# Patient Record
Sex: FEMALE | Race: BLACK OR AFRICAN AMERICAN | URBAN - METROPOLITAN AREA
[De-identification: names, ages, dates, MRNs, and addresses within clinical notes are randomized per-mention and may not be internally consistent; named-entity substitution may affect disease eponyms.]

---

## 2021-03-10 ENCOUNTER — HOSPITAL ENCOUNTER (OUTPATIENT)
Dept: PREADMISSION TESTING | Facility: HOSPITAL | Age: 61
Discharge: HOME | End: 2021-03-10
Attending: UROLOGY

## 2021-03-10 ENCOUNTER — CONVERSION ENCOUNTER (OUTPATIENT)
Dept: SURGERY | Facility: CLINIC | Age: 61
End: 2021-03-10

## 2021-03-10 ENCOUNTER — HOSPITAL ENCOUNTER (OUTPATIENT)
Dept: SURGERY | Facility: CLINIC | Age: 61
Discharge: HOME | End: 2021-03-10
Attending: UROLOGY

## 2021-03-10 ENCOUNTER — OFFICE VISIT CONVERTED (OUTPATIENT)
Dept: UROLOGY | Facility: CLINIC | Age: 61
End: 2021-03-10
Attending: UROLOGY

## 2021-03-11 LAB — SARS-COV-2 RNA SPEC QL NAA+PROBE: NOT DETECTED

## 2021-03-12 LAB — BACTERIA UR CULT: NORMAL

## 2021-03-15 ENCOUNTER — HOSPITAL ENCOUNTER (OUTPATIENT)
Dept: PERIOP | Facility: HOSPITAL | Age: 61
Setting detail: HOSPITAL OUTPATIENT SURGERY
Discharge: HOME | End: 2021-03-15
Attending: UROLOGY

## 2021-03-25 LAB
COLOR STONE: NORMAL
COMPN STONE: NORMAL
CONV CA OXALATE DIHYDRATE: 20 %
CONV CA OXALATE MONOHYDRATE: 80 %
CONV CALCULI COMMENT: NORMAL
CONV CALCULI DISCLAIMER: NORMAL
CONV CALCULI NOTE: NORMAL
CONV PHOTO (CALCULI): NORMAL
SIZE STONE: NORMAL MM
WT STONE: 27 MG

## 2021-04-12 ENCOUNTER — HOSPITAL ENCOUNTER (OUTPATIENT)
Dept: ULTRASOUND IMAGING | Facility: HOSPITAL | Age: 61
Discharge: HOME | End: 2021-04-12
Attending: UROLOGY

## 2021-04-20 ENCOUNTER — TELEPHONE CONVERTED (OUTPATIENT)
Dept: UROLOGY | Facility: CLINIC | Age: 61
End: 2021-04-20
Attending: UROLOGY

## 2021-05-10 NOTE — H&P
History and Physical      Patient Name: Sandrita Redman   Patient ID: [id number]   Sex: Female   YOB: 1960    Referring Provider: Jarrod Li MD    Visit Date: March 10, 2021    Provider: Cynthia Reed MD   Location: [facility]   Location Address: [address] [place] [address]  [id number]   Location Phone: [phone]          Chief Complaint  · Patient is here for urological concerns      History Of Present Illness     60-year-old very pleasant lady presents for further evaluation of ureteral stone.  Patient initially had acute, severe left-sided flank pain around Dana time.  Pains became so severe she presented to ER for further evaluation.  She was diagnosed with a 4 mm proximal left ureteral calculi and sent home after control of pain for trial of passage.  Patient then continued to do well but had recurrence of acute pain episode with associated emesis 1 week ago.  Repeat CT scan at [facility] indicates stable 3-4 mm stone involving the proximal left ureter with mild left hydroureteronephrosis.  Additional punctate nonobstructing left renal stone is noted.  Patient denies significant pain today but states she has not passed the stone.  Denies any associated hematuria or fever.  No voiding complaints.  No nausea or emesis today.  Not currently taking medications for symptom control.  Denies history of nephrolithiasis.    Labs, 2/26/2021:  Creatinine 0.8  WBC: 7.7  Urine culture: No pathogens isolated, contaminant    CT scan, 3/1/2021 ([facility]) 4 mm stone is noted involving the proximal left ureter causing mild left sided hydroureteronephrosis.  Additional nonobstructing left renal stone is noted.  Findings appear similar to the prior examination (12/28/2020).       Past Medical History  Arthritis; High blood pressure; Kidney stones         Past Surgical History  Caesarean section         Medication List  Bystolic 10 mg oral tablet; diltiazem HCl 180 mg  "oral capsule,ext.rel 24h degradable; losartan 25 mg oral tablet; meloxicam 15 mg oral tablet         Allergy List  NO KNOWN DRUG ALLERGIES       Allergies Reconciled  Family Medical History  Diabetes, unspecified type; Family history of breast cancer         Social History  Tobacco (Never)         Review of Systems  · Constitutional  o Denies  o : fever, chills  · Gastrointestinal  o Denies  o : nausea, vomiting      Vitals  Date Time BP Position Site L\R Cuff Size HR RR TEMP (F) WT  HT  BMI kg/m2 BSA m2 O2 Sat FR L/min FiO2        03/10/2021 10:23 AM       14  228lbs 0oz 5'  5\" 37.94 2.18             Physical Examination  · Constitutional  o Appearance  o : Well nourished, well developed patient in no acute distress.  · Eyes  o Conjunctivae  o : Conjunctivae normal  o Sclerae  o : Sclerae white  · Ears, Nose, Mouth and Throat  o Ears  o :   § Hearing  § : Intact to conversational voice both ears  § Ears  § : Normal  o Nose  o :   § External Nose  § : Appearance normal  · Skin and Subcutaneous Tissue  o General Inspection  o : No rashes, lesions, or areas of discoloration present  o General Palpation  o : Skin Turgor Normal  · Neurologic  o Mental Status Examination  o :   § Orientation  § : Alert and Oriented X3  o Gait and Station  o :   § Gait Screening  § : Ambulating without difficulty  · Psychiatric  o Mood and Affect  o : Mood normal, affect appropriate              Assessment  · Ureteral stone with hydronephrosis       Hydronephrosis with renal and ureteral calculous obstruction     592.1/N13.2    Problems Reconciled  Plan  · Medications  o Medications have been Reconciled  o Transition of Care or Provider Policy  · Instructions  o Electronically Identified Patient Education Materials Provided Electronically     Left ureteral calculi-approximately 4 mm in size.  CT report reviewed and discussed with patient.  Patient has performed trial of passage for over 2 months with 2 presentations to ER due to pain.  " Recommend to proceed with definitive surgical management of stone at this time.    Will proceed with cystoscopy, left retrograde pyelogram, ureteroscopy, laser lithotripsy, stent placement.  Risks, benefits and alternatives were discussed with patient including bleeding, infection, damage to surrounding structures, stone recurrence, stricture.  Patient also notes understanding that if unable to reach the level of the stone or if significant purulent discharge noted upon initiation of procedure that stent will be placed in definitive stone management will be deferred until the collecting system is optimized.    Counseled to return to ER immeadiately if fever, intolerance to PO; persistent emesis, or uncontrollable pain    Will schedule OR  All question addressed at this time.    MDM moderate: 1 undiagnosed new problem with uncertain prognosis; review of prior external notes; review of result of each unique test; decision regarding minor surgery with identified patient or procedure risk factors             Electronically Signed by: Cynthia Reed MD -Author on March 10, 2021 04:06:24 PM

## 2021-05-10 NOTE — H&P
"   History and Physical      Patient Name: Sandrita Redman   Patient ID: [id number]   Sex: Female   YOB: 1960    Referring Provider: Jarrod Li MD    Visit Date: March 10, 2021    Provider: Cynthia Reed MD   Location: [facility]   Location Address: [address] [place] [address]  [id number]   Location Phone: [phone]          Chief Complaint  · Outpatient History & Physical / Surgical Orders      History Of Present Illness  [facility]  Outpatient History and Physical Surgical Orders  Preadmission Location: Phone Preadmission Time: 08:00 AM   Which Facility: [facility] Surgery Date: 03/15/2021 Preadmission Testing Date: 03/11/2021   Patient's Name: Sandrita Redman YOB: 1960   Chief complaint/history present illness: left ureteral stone   Current Medication List: Bystolic 10 mg oral tablet, diltiazem HCl 180 mg oral capsule,ext.rel 24h degradable, losartan 25 mg oral tablet, and meloxicam 15 mg oral tablet   Allergies: NO KNOWN DRUG ALLERGIES   Significant past medical: Arthritis, High blood pressure, and Kidney stones   Past Surgical History: Caesarean section   Examination of heart and lungs: Breath sounds normal, no distress, Abdomen soft, non-tender, BSx4 are positive, and Regular rate, no chest retractions         Allergy List    Allergies Reconciled  Vitals  Date Time BP Position Site L\R Cuff Size HR RR TEMP (F) WT  HT  BMI kg/m2 BSA m2 O2 Sat FR L/min FiO2 HC       03/10/2021 10:23 AM       14  228lbs 0oz 5'  5\" 37.94 2.18                     Assessment  · Pre-Surgical Orders     V72.84  · Ureteral stone     592.1/N20.1  · Pre-op testing     V72.84/Z01.818    Problems Reconciled  Plan  · Orders  o General Urology Surgery Order (UROSU) - V72.84, 592.1/N20.1 - 03/15/2021  o [facility] Pre-Op Covid-19 Screening (58906) - V72.84/Z01.818 - 03/10/2021  o Urine Culture (Clean Catch) [facility] ([id number]) - 592.1/N20.1 - " 03/10/2021  · Medications  o Medications have been Reconciled  o Transition of Care or Provider Policy  · Instructions  o Pre-Operative Orders: Sign permit for cystoscopy, left retrograde, left ureteroscopy, laser lithotripsy, left stent placement  o Outpatient   o Levaquin 500 mg IV OCTOR.  o RISK AND BENEFITS:  o Possible risks/complications, benefits and alternatives to surgical or invasive procedure have been explained to patient and/or legal guardian.  o Electronically Identified Patient Education Materials Provided Electronically            Electronically Signed by: Cynthia Reed MD -Author on March 10, 2021 03:59:03 PM

## 2021-05-14 VITALS — WEIGHT: 228 LBS | HEIGHT: 65 IN | RESPIRATION RATE: 14 BRPM | BODY MASS INDEX: 37.99 KG/M2

## 2021-05-14 NOTE — PROGRESS NOTES
Progress Note      Patient Name: Sandrita Redman   Patient ID: [id number]   Sex: Female   YOB: 1960    Primary Care Provider: Jarrod Li MD   Referring Provider: Jarrod Li MD    Visit Date: April 20, 2021    Provider: Cynthia Reed MD   Location: [facility]   Location Address: [address] [place] [address]  [id number]   Location Phone: [phone]          Chief Complaint  · Patient is here for urological concerns     Telephone Visit- presents for evaluation via telephone.   Verbal consent obtained before beginning visit.   The following staff were present during this visit: Sandy Reich LPN  Total time for encounter: 8 minutes       History Of Present Illness     60-year-old very pleasant lady presents for further evaluation of ureteral stone.  Patient initially had acute, severe left-sided flank pain around Dana time.  Pains became so severe she presented to ER for further evaluation.  She was diagnosed with a 4 mm proximal left ureteral calculi and sent home after control of pain for trial of passage.  Patient then continued to do well but had recurrence of acute pain episode with associated emesis 1 week ago.  Repeat CT scan at [facility] indicates stable 3-4 mm stone involving the proximal left ureter with mild left hydroureteronephrosis.  Additional punctate nonobstructing left renal stone is noted.  Patient denies significant pain today but states she has not passed the stone.  Denies any associated hematuria or fever.  No voiding complaints.  No nausea or emesis today.  Not currently taking medications for symptom control.  Denies history of nephrolithiasis.    Update 4/20/2021: presents for follow-up status post left ureteroscopy laser lithotripsy for ureteral calculi, 3/15/2021.  Patient subsequently removed stent as instructed and presents with renal ultrasound prior to today's appointment. Patient states that she has been doing well since  her procedure.  Currently denies any urinary symptoms.  Denies hematuria or flank pain.  No complaints today.  Doing very well.    _________________  Chemical stone analysis, 3/25/2021: 100% calcium oxalate    Renal ultrasound, 2021: No significant hydronephrosis; may be few punctate left-sided nephroliths; very mild pelviectasis on the left; mild dilation of the calyces in the mid and lower right renal collecting system versus renal sinus cysts    Labs, 2021:  Creatinine 0.8  WBC: 7.7  Urine culture: No pathogens isolated, contaminant    CT scan, 3/1/2021 ([facility]) 4 mm stone is noted involving the proximal left ureter causing mild left sided hydroureteronephrosis.  Additional nonobstructing left renal stone is noted.  Findings appear similar to the prior examination (2020).       Past Medical History  Arthritis; High blood pressure; Kidney Stones         Past Surgical History  Caesarean section         Medication List  Bystolic 10 mg oral tablet; diltiazem HCl 180 mg oral capsule,ext.rel 24h degradable; losartan 25 mg oral tablet; meloxicam 15 mg oral tablet         Allergy List  NO KNOWN DRUG ALLERGIES       Allergies Reconciled  Family Medical History  Diabetes, unspecified type; Family history of breast cancer         Social History  Tobacco (Never)         Review of Systems  · Constitutional  o Denies  o : chills, fever  · Gastrointestinal  o Denies  o : nausea, vomiting          Results       [facility]      PACS RADIOLOGY REPORT    Patient: LUIS VITALE Acct: #[id number] Report: #[id number]    UNIT #: [id number]  DOS: 21 0928  INSURANCE:[employer] - PPO ORDER #: [id number]  LOCATION:   : 1960    PROVIDERS  ADMITTING:   ATTENDING: HARSHAD CARVER  FAMILY:  JIMENEZ SAENZ ORDERING:  HARSHAD CARVER   OTHER:  DICTATING:  Roosevelt Holcomb MD    REQ #:[id number]   EXAM:CRETR - COMPLETE RETROPERI CALLI  KIDNEYS  REASON FOR EXAM:  KIDNEY STONES  REASON FOR VISIT:  KIDNEY STONES    *******Signed******     PROCEDURE: U/S BILATERAL  KIDNEYS     COMPARISON: [facility], CR, UROGRAPHY/RETROGRADE W/WO KUB, 3/15/2021, 7:40.     INDICATIONS: KIDNEY STONES     TECHNIQUE: Ultrasound examination of the kidneys and bladder was performed.       FINDINGS:   Right kidney measures 12.1 centimeters left kidney measures 11.9 centimeters in length.  The right   kidney demonstrates no focal lesion.  Minimal left-sided pelvicaliectasis.  Left kidney   demonstrates mild dilation of left collecting system.  There may be a few punctate left-sided   nephroliths.  There bladder appears within normal limits.  Left ureteral jet not visualized.       CONCLUSION:   1. There may be a few punctate left-sided nephroliths.  2. No significant hydronephrosis.  Mild dilation of calices in the mid and lower right renal   collecting system versus renal sinus cysts.  There is very mild pelvicaliectasis on the left.  3. Left ureteral jet was not visualized.  If concern for ureteral stone consider CT.            COLE GUAMAN MD         Electronically Signed and Approved By: COLE GUAMAN MD on 4/12/2021 at 13:13                     Until signed, this is an unconfirmed preliminary report that may contain  errors and is subject to change.                SCHJO:  D:04/12/21 1018         Assessment  · Calcium oxalate calculus     275.49/E83.59    Problems Reconciled  Plan  · Orders  o Physican Telephone evaluation, 5-10 min (09698) - 275.49/E83.59 - 04/20/2021  · Medications  o Medications have been Reconciled  o Transition of Care or Provider Policy  · Instructions  o Electronically Identified Patient Education Materials Provided Electronically     Renal ultrasound imaging reviewed and discussed with patient at length, no hydronephrosis; very mild pelviectasis believed to be physiologic; possible few punctate stones on ultrasound believed  to be residual calcifications from laser lithotripsy.  No further intervention necessary at this time.     Calcium oxalate stone- Discussed dietary modifications for prevention of stone growth and recurrence including increased hydration, decrease sodium, normal calcium, low animal protein diet.    Informational handouts to be mailed to patient  All questions addressed    Patient encouraged to follow-up as needed             Electronically Signed by: Cynthia Reed MD -Author on April 20, 2021 05:17:15 PM

## 2024-05-10 ENCOUNTER — TELEPHONE (OUTPATIENT)
Dept: DIABETES SERVICES | Facility: HOSPITAL | Age: 64
End: 2024-05-10
Payer: COMMERCIAL

## 2024-05-10 ENCOUNTER — OFFICE VISIT (OUTPATIENT)
Dept: DIABETES SERVICES | Facility: HOSPITAL | Age: 64
End: 2024-05-10
Payer: COMMERCIAL

## 2024-05-10 VITALS
BODY MASS INDEX: 37.32 KG/M2 | OXYGEN SATURATION: 98 % | HEIGHT: 65 IN | SYSTOLIC BLOOD PRESSURE: 142 MMHG | HEART RATE: 63 BPM | DIASTOLIC BLOOD PRESSURE: 77 MMHG | WEIGHT: 224 LBS

## 2024-05-10 DIAGNOSIS — E11.65 TYPE 2 DIABETES MELLITUS WITH HYPERGLYCEMIA, UNSPECIFIED WHETHER LONG TERM INSULIN USE: Primary | ICD-10-CM

## 2024-05-10 DIAGNOSIS — E66.01 SEVERE OBESITY (BMI >= 40): ICD-10-CM

## 2024-05-10 DIAGNOSIS — R11.2 NAUSEA AND VOMITING, UNSPECIFIED VOMITING TYPE: ICD-10-CM

## 2024-05-10 PROBLEM — E11.9 TYPE 2 DIABETES MELLITUS: Status: ACTIVE | Noted: 2021-07-30

## 2024-05-10 PROBLEM — M19.90 ARTHRITIS: Status: ACTIVE | Noted: 2024-05-10

## 2024-05-10 PROBLEM — N20.0 CALCULUS OF KIDNEY: Status: ACTIVE | Noted: 2020-12-29

## 2024-05-10 PROBLEM — I10 ESSENTIAL HYPERTENSION: Status: ACTIVE | Noted: 2021-07-30

## 2024-05-10 LAB
EXPIRATION DATE: ABNORMAL
GLUCOSE BLDC GLUCOMTR-MCNC: 179 MG/DL (ref 70–99)
HBA1C MFR BLD: 7 % (ref 4.5–5.7)
Lab: ABNORMAL

## 2024-05-10 PROCEDURE — 82948 REAGENT STRIP/BLOOD GLUCOSE: CPT | Performed by: NURSE PRACTITIONER

## 2024-05-10 PROCEDURE — 83036 HEMOGLOBIN GLYCOSYLATED A1C: CPT | Performed by: NURSE PRACTITIONER

## 2024-05-10 PROCEDURE — G0463 HOSPITAL OUTPT CLINIC VISIT: HCPCS | Performed by: NURSE PRACTITIONER

## 2024-05-10 RX ORDER — LOSARTAN POTASSIUM AND HYDROCHLOROTHIAZIDE 12.5; 1 MG/1; MG/1
1 TABLET ORAL DAILY
COMMUNITY
Start: 2024-03-03

## 2024-05-10 RX ORDER — NEBIVOLOL 10 MG/1
10 TABLET ORAL DAILY
COMMUNITY

## 2024-05-10 RX ORDER — ONDANSETRON 4 MG/1
4 TABLET, FILM COATED ORAL EVERY 8 HOURS PRN
Qty: 30 TABLET | Refills: 0 | Status: SHIPPED | OUTPATIENT
Start: 2024-05-10

## 2024-05-10 RX ORDER — SEMAGLUTIDE 1.34 MG/ML
0.5 INJECTION, SOLUTION SUBCUTANEOUS WEEKLY
Qty: 4.5 ML | Refills: 0 | Status: SHIPPED | OUTPATIENT
Start: 2024-05-10 | End: 2024-08-08

## 2024-05-10 RX ORDER — MELOXICAM 15 MG/1
15 TABLET ORAL DAILY
COMMUNITY

## 2024-05-10 RX ORDER — DILTIAZEM HYDROCHLORIDE 180 MG/1
180 CAPSULE, EXTENDED RELEASE ORAL DAILY
COMMUNITY
Start: 2024-03-03

## 2024-06-28 ENCOUNTER — OFFICE VISIT (OUTPATIENT)
Dept: DIABETES SERVICES | Facility: HOSPITAL | Age: 64
End: 2024-06-28
Payer: COMMERCIAL

## 2024-06-28 VITALS
WEIGHT: 223.4 LBS | SYSTOLIC BLOOD PRESSURE: 149 MMHG | OXYGEN SATURATION: 99 % | HEIGHT: 65 IN | DIASTOLIC BLOOD PRESSURE: 85 MMHG | BODY MASS INDEX: 37.22 KG/M2 | HEART RATE: 75 BPM

## 2024-06-28 DIAGNOSIS — Z13.220 SCREENING FOR LIPID DISORDERS: ICD-10-CM

## 2024-06-28 DIAGNOSIS — E11.65 TYPE 2 DIABETES MELLITUS WITH HYPERGLYCEMIA, WITHOUT LONG-TERM CURRENT USE OF INSULIN: Primary | ICD-10-CM

## 2024-06-28 DIAGNOSIS — E11.9 TYPE 2 DIABETES MELLITUS WITH HEMOGLOBIN A1C GOAL OF LESS THAN 7.0%: ICD-10-CM

## 2024-06-28 DIAGNOSIS — E11.65 TYPE 2 DIABETES MELLITUS WITH HYPERGLYCEMIA, UNSPECIFIED WHETHER LONG TERM INSULIN USE: ICD-10-CM

## 2024-06-28 LAB
EXPIRATION DATE: ABNORMAL
GLUCOSE BLDC GLUCOMTR-MCNC: 123 MG/DL (ref 70–99)
HBA1C MFR BLD: 6.2 % (ref 4.5–5.7)
Lab: ABNORMAL

## 2024-06-28 PROCEDURE — 83036 HEMOGLOBIN GLYCOSYLATED A1C: CPT | Performed by: NURSE PRACTITIONER

## 2024-06-28 PROCEDURE — G0463 HOSPITAL OUTPT CLINIC VISIT: HCPCS | Performed by: NURSE PRACTITIONER

## 2024-06-28 PROCEDURE — 82948 REAGENT STRIP/BLOOD GLUCOSE: CPT | Performed by: NURSE PRACTITIONER

## 2024-06-28 PROCEDURE — 99214 OFFICE O/P EST MOD 30 MIN: CPT | Performed by: NURSE PRACTITIONER

## 2024-06-28 RX ORDER — LANCETS 30 GAUGE
EACH MISCELLANEOUS
Qty: 100 EACH | Refills: 5 | Status: SHIPPED | OUTPATIENT
Start: 2024-06-28

## 2024-06-28 RX ORDER — BLOOD-GLUCOSE METER
1 KIT MISCELLANEOUS DAILY
Qty: 1 EACH | Refills: 0 | Status: SHIPPED | OUTPATIENT
Start: 2024-06-28

## 2024-06-28 RX ORDER — SEMAGLUTIDE 1.34 MG/ML
0.5 INJECTION, SOLUTION SUBCUTANEOUS WEEKLY
Qty: 4.5 ML | Refills: 1 | Status: SHIPPED | OUTPATIENT
Start: 2024-06-28 | End: 2024-09-26

## 2024-06-28 NOTE — PROGRESS NOTES
Chief Complaint  Diabetes (Follow up, med mgt, A1c eval)    Referred By: Jarrod Tom MD    Subjective          Sandrita Redman presents to [facility] for diabetes medication management    History of Present Illness    Visit type:  follow-up  Diabetes type:  Type 2  Current diabetes status/concerns/issues:  Reports she is tolerating the Ozempic well w/o any side effects. She is requesting a freestyle glucometer.   Other health concerns: No new health concerns  Current Diabetes symptoms:    Polyuria: No   Polydipsia: No   Polyphagia: Yes     Blurred vision: No   Excessive fatigue: No  Known Diabetes complications:  Neuropathy: None; Location: N/A  Renal:  Called PCP for labs to review  Eyes: No current eye exam available in record; Location: N/A; Last Eye Exam:   ; Location: [facility]  Amputation/Wounds: None  GI: None  Cardiovascular: Hypertension  ED: N/A  Other: None  Hypoglycemia:    Hypoglycemia Symptoms:  No hypoglycemia at this time  Current diabetes treatment:   metformin 500mg qd, Ozempic 0.25mg once wk  Blood glucose device:  Meter  Blood glucose monitoring frequency:   every other day  Blood glucose range/average:   reports her fbs is 110-117mg/dl  Glucose Source: Patient Reported  Dietary behavior:  Limits high carb/sweet foods, Avoids sugary drinks, Number of meals each day - 2; Number of snacks each day - 0  Activity/Exercise:  None    Past Medical History:   Diagnosis Date    Diabetes mellitus     Hypertension      Past Surgical History:   Procedure Laterality Date     SECTION      KIDNEY STONE SURGERY Right      Family History   Problem Relation Age of Onset    Heart disease Mother     Heart disease Father     Diabetes type II Father     Stroke Paternal Grandfather     Diabetes type II Paternal Grandfather      Social History     Socioeconomic History    Marital status:    Tobacco Use    Smoking status: Never   Vaping  "Use    Vaping status: Never Used   Substance and Sexual Activity    Alcohol use: Never    Drug use: Never     No Known Allergies    Current Outpatient Medications:     dilTIAZem XR (DILACOR XR) 180 MG 24 hr capsule, Take 1 capsule by mouth Daily., Disp: , Rfl:     losartan-hydrochlorothiazide (HYZAAR) 100-12.5 MG per tablet, Take 1 tablet by mouth Daily., Disp: , Rfl:     meloxicam (MOBIC) 15 MG tablet, Take 1 tablet by mouth Daily., Disp: , Rfl:     nebivolol (Bystolic) 10 MG tablet, Take 1 tablet by mouth Daily., Disp: , Rfl:     Semaglutide,0.25 or 0.5MG/DOS, (Ozempic, 0.25 or 0.5 MG/DOSE,) 2 MG/1.5ML solution pen-injector, Inject 0.5 mg under the skin into the appropriate area as directed 1 (One) Time Per Week for 90 days., Disp: 4.5 mL, Rfl: 1    glucose blood test strip, Test blood glucose 1 times each day, Disp: 100 each, Rfl: 5    glucose monitor monitoring kit, Use 1 each Daily., Disp: 1 each, Rfl: 0    Lancets misc, Test blood glucose 1 times each day, Disp: 100 each, Rfl: 5    Objective     Vitals:    06/28/24 0915 06/28/24 0917   BP: 142/91 149/85   BP Location: Right arm Left arm   Patient Position: Sitting Sitting   Cuff Size: Adult Adult   Pulse: 75    SpO2: 99%    Weight: 101 kg (223 lb 6.4 oz)    Height: 165.1 cm (65\")    PainSc: 0-No pain      Body mass index is 37.18 kg/m².    Physical Exam  Constitutional:       Appearance: Normal appearance. She is normal weight. She is obese.      Comments: Obesity (BMI 30 - 39.9) Pt Current BMI = 37.18     HENT:      Head: Normocephalic and atraumatic.      Right Ear: External ear normal.      Left Ear: External ear normal.      Nose: Nose normal.   Eyes:      Extraocular Movements: Extraocular movements intact.      Conjunctiva/sclera: Conjunctivae normal.   Pulmonary:      Effort: Pulmonary effort is normal.   Musculoskeletal:         General: Normal range of motion.      Cervical back: Normal range of motion.   Skin:     General: Skin is warm and dry. "   Neurological:      General: No focal deficit present.      Mental Status: She is alert and oriented to person, place, and time. Mental status is at baseline.   Psychiatric:         Mood and Affect: Mood normal.         Behavior: Behavior normal.         Thought Content: Thought content normal.         Judgment: Judgment normal.             Result Review :   The following data was reviewed by: ADEOLA Nieto on 06/28/2024:    Most Recent A1C          6/28/2024    09:23   HGBA1C Most Recent   Hemoglobin A1C 6.2        A1C Last 3 Results          5/10/2024    09:27 6/28/2024    09:23   HGBA1C Last 3 Results   Hemoglobin A1C 7.0  6.2      A1c collected in the office today is 6.2%, indicating Controlled Type II diabetes.  This result is down from the prior result of 7% collected on 5/10/2024    Glucose   Date Value Ref Range Status   06/28/2024 123 (H) 70 - 99 mg/dL Final     Comment:     Serial Number: [id number]:  [id number]     Point of care glucose in the office today is within normal limits for nonfasting glucose              Assessment: Patient is here today for 6-week follow-up on her diabetes.  Last visit Ozempic 0.25 mg once weekly was prescribed x 4 weeks and she was to taper up to 0.5 mg once weekly.  She reports she is still on the 0.25 mg dose.  States her fasting blood sugar is running 110 to 117 mg/dL.  Her A1c in the office today is 6.2% which is down from her previous A1c of 7% in May.      Diagnoses and all orders for this visit:    1. Type 2 diabetes mellitus with hyperglycemia, without long-term current use of insulin (Primary)  -     POC Glycosylated Hemoglobin (Hb A1C)  -     glucose monitor monitoring kit; Use 1 each Daily.  Dispense: 1 each; Refill: 0  -     glucose blood test strip; Test blood glucose 1 times each day  Dispense: 100 each; Refill: 5  -     Lancets misc; Test blood glucose 1 times each day  Dispense: 100 each; Refill: 5  -     Comprehensive Metabolic Panel;  Future  -     Microalbumin / Creatinine Urine Ratio - Urine, Clean Catch; Future    2. Screening for lipid disorders  -     Lipid Panel; Future    3. Type 2 diabetes mellitus with hyperglycemia, unspecified whether long term insulin use  -     Semaglutide,0.25 or 0.5MG/DOS, (Ozempic, 0.25 or 0.5 MG/DOSE,) 2 MG/1.5ML solution pen-injector; Inject 0.5 mg under the skin into the appropriate area as directed 1 (One) Time Per Week for 90 days.  Dispense: 4.5 mL; Refill: 1    4. Type 2 diabetes mellitus with hemoglobin A1c goal of less than 7.0%    Other orders  -     POC Glucose        Plan: Discontinued metformin and increased her Ozempic from 0.25 mg once weekly to 0.5 mg once weekly.  Instructed patient do this injection on the same day of the week.  She is due for a lipid, comp and urine micro which were ordered at her visit today.  Scheduled a 3-month follow-up and we will recheck her A1c at that time.    The patient will monitor her blood glucose levels once daily.  If she develops problematic hyperglycemia or hypoglycemia or adverse drug reactions, she will contact the office for further instructions.        Follow Up     Return in about 3 months (around 9/28/2024) for Medication Mgmt.    Patient was given instructions and counseling regarding her condition or for health maintenance advice. Please see specific information pulled into the AVS if appropriate.     Kimberley Ramirez, ADEOLA  06/28/2024      Dictated Utilizing Dragon Dictation.  Please note that portions of this note were completed with a voice recognition program.  Part of this note may be an electronic transcription/translation of spoken language to printed text using the Dragon Dictation System.

## 2024-08-07 ENCOUNTER — TELEPHONE (OUTPATIENT)
Dept: DIABETES SERVICES | Facility: HOSPITAL | Age: 64
End: 2024-08-07
Payer: COMMERCIAL

## 2024-08-07 NOTE — TELEPHONE ENCOUNTER
----- Message from Kimberley Ramirez sent at 7/24/2024  1:27 PM EDT -----  Her CMP shows elevated glucose and known diabetic, her renal and liver function is normal.  Her cholesterol and LDL are elevated has she been on a statin in the past?  Her LDL is 144 and for diabetics should be less than 70.  Would she consider starting a statin?

## 2024-08-07 NOTE — TELEPHONE ENCOUNTER
Hub may relay:   Her CMP shows elevated glucose and known diabetic, her renal and liver function is normal.  Her cholesterol and LDL are elevated has she been on a statin in the past?  Her LDL is 144 and for diabetics should be less than 70.  Would she consider starting a statin?

## 2024-08-12 NOTE — TELEPHONE ENCOUNTER
Spoke to Sandrita Redman , gave results. Pt verbalized understanding. Pt stated she's is currently not on a statin, and would consider medication therapy but first wanted to know if it was a permanent medication.   Pls advise

## 2024-08-12 NOTE — TELEPHONE ENCOUNTER
Spoke with Sandrita Redman, gave medication usage advice and recommendations per ADEOLA Sims- pt verbalized understanding. Pt agreed to start recommended medication therapy.     Confirmed pts preferred pharmacy is [facility] in [place], [place].  Pls advise

## 2024-08-13 DIAGNOSIS — E78.49 OTHER HYPERLIPIDEMIA: Primary | ICD-10-CM

## 2024-08-13 RX ORDER — ATORVASTATIN CALCIUM 10 MG/1
10 TABLET, FILM COATED ORAL DAILY
Qty: 90 TABLET | Refills: 0 | Status: SHIPPED | OUTPATIENT
Start: 2024-08-13 | End: 2024-11-11

## 2024-09-23 ENCOUNTER — TELEPHONE (OUTPATIENT)
Dept: DIABETES SERVICES | Facility: HOSPITAL | Age: 64
End: 2024-09-23

## 2024-09-23 NOTE — TELEPHONE ENCOUNTER
Provider: BETHANY MIR     Caller: LUIS VITALE     Relationship to Patient: SELF     Phone Number: [phone]     Reason for Call: PT IS WONDERING IF SHE NEEDS LABS DONE BEFORE COMING INTO THE OFFICE 9/27/24 AND IF SO SHE WILL NEED ORDERS PUT IN PLEASE ADVISE AND CALL PT BACK PT STATED OK TO LEAVE VM

## 2024-09-27 ENCOUNTER — OFFICE VISIT (OUTPATIENT)
Dept: DIABETES SERVICES | Facility: HOSPITAL | Age: 64
End: 2024-09-27
Payer: COMMERCIAL

## 2024-09-27 VITALS
HEART RATE: 76 BPM | OXYGEN SATURATION: 97 % | WEIGHT: 213.4 LBS | SYSTOLIC BLOOD PRESSURE: 128 MMHG | BODY MASS INDEX: 35.56 KG/M2 | DIASTOLIC BLOOD PRESSURE: 71 MMHG | HEIGHT: 65 IN

## 2024-09-27 DIAGNOSIS — E66.9 OBESITY (BMI 30-39.9): ICD-10-CM

## 2024-09-27 DIAGNOSIS — E78.49 OTHER HYPERLIPIDEMIA: ICD-10-CM

## 2024-09-27 DIAGNOSIS — E11.9 TYPE 2 DIABETES MELLITUS WITH HEMOGLOBIN A1C GOAL OF LESS THAN 7.0%: Primary | ICD-10-CM

## 2024-09-27 DIAGNOSIS — E11.65 TYPE 2 DIABETES MELLITUS WITH HYPERGLYCEMIA, WITHOUT LONG-TERM CURRENT USE OF INSULIN: ICD-10-CM

## 2024-09-27 LAB
ALBUMIN UR-MCNC: <1.2 MG/DL
CREAT UR-MCNC: 12.1 MG/DL
EXPIRATION DATE: ABNORMAL
GLUCOSE BLDC GLUCOMTR-MCNC: 122 MG/DL (ref 70–99)
HBA1C MFR BLD: 6.2 % (ref 4.5–5.7)
Lab: ABNORMAL
MICROALBUMIN/CREAT UR: NORMAL MG/G{CREAT}

## 2024-09-27 PROCEDURE — 82570 ASSAY OF URINE CREATININE: CPT | Performed by: NURSE PRACTITIONER

## 2024-09-27 PROCEDURE — 82043 UR ALBUMIN QUANTITATIVE: CPT | Performed by: NURSE PRACTITIONER

## 2024-09-27 PROCEDURE — 83036 HEMOGLOBIN GLYCOSYLATED A1C: CPT | Performed by: NURSE PRACTITIONER

## 2024-09-27 PROCEDURE — G0463 HOSPITAL OUTPT CLINIC VISIT: HCPCS | Performed by: NURSE PRACTITIONER

## 2024-09-27 PROCEDURE — 82948 REAGENT STRIP/BLOOD GLUCOSE: CPT | Performed by: NURSE PRACTITIONER

## 2024-09-27 RX ORDER — BLOOD-GLUCOSE METER
1 KIT MISCELLANEOUS SEE ADMIN INSTRUCTIONS
COMMUNITY
Start: 2024-09-23

## 2024-10-02 ENCOUNTER — TELEPHONE (OUTPATIENT)
Dept: DIABETES SERVICES | Facility: HOSPITAL | Age: 64
End: 2024-10-02
Payer: COMMERCIAL

## 2024-10-10 DIAGNOSIS — E11.65 TYPE 2 DIABETES MELLITUS WITH HYPERGLYCEMIA, UNSPECIFIED WHETHER LONG TERM INSULIN USE: ICD-10-CM

## 2024-10-10 RX ORDER — SEMAGLUTIDE 0.68 MG/ML
INJECTION, SOLUTION SUBCUTANEOUS
Qty: 4.5 ML | Refills: 1 | Status: SHIPPED | OUTPATIENT
Start: 2024-10-10

## 2024-10-16 DIAGNOSIS — E78.49 OTHER HYPERLIPIDEMIA: ICD-10-CM

## 2024-10-16 RX ORDER — ATORVASTATIN CALCIUM 10 MG/1
10 TABLET, FILM COATED ORAL DAILY
Qty: 90 TABLET | Refills: 0 | Status: SHIPPED | OUTPATIENT
Start: 2024-10-16 | End: 2025-01-14

## 2024-10-16 NOTE — TELEPHONE ENCOUNTER
Patient requesting a 90 day refill. Kimberley last sent this medicine in on 8/13/24 for 90 days with 0 refills. Patient will be out on 11/13/24. She is scheduled for a follow up visit on 12/13/24. Ok to refill?    Rx Refill Note  Requested Prescriptions     Pending Prescriptions Disp Refills    atorvastatin (Lipitor) 10 MG tablet 90 tablet 0     Sig: Take 1 tablet by mouth Daily for 90 days.      Last office visit with prescribing clinician: Visit date not found   Last telemedicine visit with prescribing clinician: Visit date not found   Next office visit with prescribing clinician: Visit date not found                         Would you like a call back once the refill request has been completed: [] Yes [] No    If the office needs to give you a call back, can they leave a voicemail: [] Yes [] No    Briana Frye MA  10/16/24, 07:35 EDT

## 2024-11-12 DIAGNOSIS — E78.49 OTHER HYPERLIPIDEMIA: ICD-10-CM

## 2024-11-20 RX ORDER — ATORVASTATIN CALCIUM 10 MG/1
10 TABLET, FILM COATED ORAL DAILY
Qty: 90 TABLET | Refills: 0 | OUTPATIENT
Start: 2024-11-20

## 2024-11-20 NOTE — TELEPHONE ENCOUNTER
Called and spoke with patient.  Instructed you would like for her to have her labs drawn before the medication is refilled.   Patient states she has enough to last her until her appt which is on 12/13/2024.  States she will get her labs so that you can see what they are before her appointmnet.

## 2024-12-03 NOTE — PROGRESS NOTES
Chief Complaint  Diabetes (Follow up, med mgt, A1c eval)    Referred By: Jarrod Tom MD    Subjective          Sandrita Redman presents to [facility] for diabetes medication management    History of Present Illness    Visit type:  follow-up  Diabetes type:  Type 2  Current diabetes status/concerns/issues:  Reports her fbs is running around 117-121mg/dl. She has lost 19lbs since starting Ozempic.  Reports she is tolerating the Ozempic well without any side effects of Ozempic.   Other health concerns: No new health concerns  Current Diabetes symptoms:    Polyuria: No   Polydipsia: No   Polyphagia: No   Blurred vision: No   Excessive fatigue: No  Known Diabetes complications:  Neuropathy: None; Location: N/A  Renal:  Called PCP for labs to review  Eyes: No current eye exam available in record; Location: N/A; Last Eye Exam:   ; Location: [facility]-Dr.Sarah Wakefield  Amputation/Wounds: None  GI: None  Cardiovascular: Hypertension  ED: N/A  Other: None  Hypoglycemia:  None reported at this time  Hypoglycemia Symptoms:  No hypoglycemia at this time  Current diabetes treatment:    Ozempic 0.5mg once wk    Blood glucose device:  Meter  Blood glucose monitoring frequency:  1  Blood glucose range/average:   117-121mg/dl  Glucose Source: Patient Reported  Dietary behavior:  Limits high carb/sweet foods, Avoids sugary drinks, Number of meals each day - 2; Number of snacks each day - 0  Activity/Exercise:  None       Past Medical History:   Diagnosis Date    Diabetes mellitus     Hypertension      Past Surgical History:   Procedure Laterality Date     SECTION      KIDNEY STONE SURGERY Right      Family History   Problem Relation Age of Onset    Heart disease Mother     Heart disease Father     Diabetes type II Father     Stroke Paternal Grandfather     Diabetes type II Paternal Grandfather      Social History     Socioeconomic History    Marital status:   "  Tobacco Use    Smoking status: Never   Vaping Use    Vaping status: Never Used   Substance and Sexual Activity    Alcohol use: Never    Drug use: Never     No Known Allergies    Current Outpatient Medications:     atorvastatin (Lipitor) 10 MG tablet, Take 1 tablet by mouth Daily for 90 days., Disp: 90 tablet, Rfl: 0    Blood Glucose Monitoring Suppl (FreeStyle Lite) w/Device kit, 1 each by Other route See Admin Instructions., Disp: , Rfl:     dilTIAZem XR (DILACOR XR) 180 MG 24 hr capsule, Take 1 capsule by mouth Daily., Disp: , Rfl:     glucose blood test strip, Test blood glucose 1 times each day, Disp: 100 each, Rfl: 5    glucose monitor monitoring kit, Use 1 each Daily., Disp: 1 each, Rfl: 0    Lancets misc, Test blood glucose 1 times each day, Disp: 100 each, Rfl: 5    losartan-hydrochlorothiazide (HYZAAR) 100-12.5 MG per tablet, Take 1 tablet by mouth Daily., Disp: , Rfl:     meloxicam (MOBIC) 15 MG tablet, Take 1 tablet by mouth Daily., Disp: , Rfl:     nebivolol (Bystolic) 10 MG tablet, Take 1 tablet by mouth Daily., Disp: , Rfl:     Ozempic, 0.25 or 0.5 MG/DOSE, 2 MG/3ML solution pen-injector, inject 0.5 mg SUBCUTANEOUSLY into the appropriate area AS DIRECTED ONCE WEEKLY, Disp: 4.5 mL, Rfl: 1    Objective     Vitals:    12/04/24 1038   BP: 131/82   BP Location: Right arm   Patient Position: Sitting   Cuff Size: Large Adult   Pulse: 85   Temp: 97.5 °F (36.4 °C)   TempSrc: Infrared   SpO2: 94%   Weight: 93 kg (205 lb)   Height: 165.1 cm (65\")     Body mass index is 34.11 kg/m².    Physical Exam  Constitutional:       Appearance: Normal appearance. She is normal weight. She is obese.      Comments: Obesity (BMI 30 - 39.9) Pt Current BMI = 34.11     HENT:      Head: Normocephalic and atraumatic.      Right Ear: External ear normal.      Left Ear: External ear normal.      Nose: Nose normal.   Eyes:      Extraocular Movements: Extraocular movements intact.      Conjunctiva/sclera: Conjunctivae normal. "   Pulmonary:      Effort: Pulmonary effort is normal.   Musculoskeletal:         General: Normal range of motion.      Cervical back: Normal range of motion.   Skin:     General: Skin is warm and dry.   Neurological:      General: No focal deficit present.      Mental Status: She is alert and oriented to person, place, and time. Mental status is at baseline.   Psychiatric:         Mood and Affect: Mood normal.         Behavior: Behavior normal.         Thought Content: Thought content normal.         Judgment: Judgment normal.             Result Review :   The following data was reviewed by: ADEOLA Nieto on 12/04/2024:    Most Recent A1C          12/4/2024    10:48   HGBA1C Most Recent   Hemoglobin A1C 6.0        A1C Last 3 Results          6/28/2024    09:23 9/27/2024    09:46 12/4/2024    10:48   HGBA1C Last 3 Results   Hemoglobin A1C 6.2  6.2  6.0      A1c collected in the office today is 6%, indicating Controlled Type II diabetes.  This result is down from the prior result of 6.2% collected on 9/27/2024    Glucose   Date Value Ref Range Status   12/04/2024 127 (H) 70 - 99 mg/dL Final     Comment:     Serial Number: [id number]:  [id number]     Point of care glucose in the office today is within normal limits for nonfasting glucose      Microalbumin, Urine   Date Value Ref Range Status   09/27/2024 <1.2 mg/dL Final     Creatinine, Urine   Date Value Ref Range Status   09/27/2024 12.1 mg/dL Final     Microalbumin/Creatinine Ratio   Date Value Ref Range Status   09/27/2024   Final     Comment:     Unable to calculate     Urine microalbuminuria collected on 9/27/2024 is negative for microalbuminuria             Assessment: Patient is here today for 3-month follow-up on her diabetes. Reports her fbs is running around 117-121mg/dl. She has lost 19lbs since starting Ozempic in May.  Denies any side effects from Ozempic.  Last visit she was placed on Lipitor 10 mg daily for hyperlipidemia.  Reports  she is tolerating this medication well without any side effects.  She had her lipid profile repeated on 12-24.  In review of her lipid panel her levels are now within normal limits.  Her A1c in the office today is 6% which is down from her previous A1c of 6.2% in September.      Diagnoses and all orders for this visit:    1. Type 2 diabetes mellitus with hemoglobin A1c goal of less than 7.0% (Primary)    2. Type 2 diabetes mellitus with hyperglycemia, unspecified whether long term insulin use  -     POC Glycosylated Hemoglobin (Hb A1C)    3. Other hyperlipidemia  -     atorvastatin (Lipitor) 10 MG tablet; Take 1 tablet by mouth Daily for 90 days.  Dispense: 90 tablet; Refill: 0    4. Obesity (BMI 30-39.9)    Other orders  -     POC Glucose        Plan: No changes were made to her plan of care today.  Scheduled a 3-month follow-up and we will recheck her A1c at that time.    The patient will monitor her blood glucose levels once daily.  If she develops problematic hyperglycemia or hypoglycemia or adverse drug reactions, she will contact the office for further instructions.        Follow Up     Return in about 3 months (around 3/4/2025) for Medication Mgmt, CGM Follow Up.    Patient was given instructions and counseling regarding her condition or for health maintenance advice. Please see specific information pulled into the AVS if appropriate.     Kimberley Ramirez, APRN  12/04/2024      Dictated Utilizing Dragon Dictation.  Please note that portions of this note were completed with a voice recognition program.  Part of this note may be an electronic transcription/translation of spoken language to printed text using the Dragon Dictation System.

## 2024-12-04 ENCOUNTER — OFFICE VISIT (OUTPATIENT)
Dept: DIABETES SERVICES | Facility: HOSPITAL | Age: 64
End: 2024-12-04
Payer: COMMERCIAL

## 2024-12-04 VITALS
TEMPERATURE: 97.5 F | HEART RATE: 85 BPM | WEIGHT: 205 LBS | DIASTOLIC BLOOD PRESSURE: 82 MMHG | HEIGHT: 65 IN | OXYGEN SATURATION: 94 % | SYSTOLIC BLOOD PRESSURE: 131 MMHG | BODY MASS INDEX: 34.16 KG/M2

## 2024-12-04 DIAGNOSIS — E11.9 TYPE 2 DIABETES MELLITUS WITH HEMOGLOBIN A1C GOAL OF LESS THAN 7.0%: Primary | ICD-10-CM

## 2024-12-04 DIAGNOSIS — E11.65 TYPE 2 DIABETES MELLITUS WITH HYPERGLYCEMIA, UNSPECIFIED WHETHER LONG TERM INSULIN USE: ICD-10-CM

## 2024-12-04 DIAGNOSIS — E66.9 OBESITY (BMI 30-39.9): ICD-10-CM

## 2024-12-04 DIAGNOSIS — E78.49 OTHER HYPERLIPIDEMIA: ICD-10-CM

## 2024-12-04 LAB
EXPIRATION DATE: ABNORMAL
GLUCOSE BLDC GLUCOMTR-MCNC: 127 MG/DL (ref 70–99)
HBA1C MFR BLD: 6 % (ref 4.5–5.7)
Lab: ABNORMAL

## 2024-12-04 PROCEDURE — 82948 REAGENT STRIP/BLOOD GLUCOSE: CPT | Performed by: NURSE PRACTITIONER

## 2024-12-04 PROCEDURE — G0463 HOSPITAL OUTPT CLINIC VISIT: HCPCS | Performed by: NURSE PRACTITIONER

## 2024-12-04 PROCEDURE — 99213 OFFICE O/P EST LOW 20 MIN: CPT | Performed by: NURSE PRACTITIONER

## 2024-12-04 PROCEDURE — 83036 HEMOGLOBIN GLYCOSYLATED A1C: CPT | Performed by: NURSE PRACTITIONER

## 2024-12-04 RX ORDER — ATORVASTATIN CALCIUM 10 MG/1
10 TABLET, FILM COATED ORAL DAILY
Qty: 90 TABLET | Refills: 0 | Status: SHIPPED | OUTPATIENT
Start: 2024-12-04 | End: 2025-03-04

## 2025-01-13 DIAGNOSIS — E11.65 TYPE 2 DIABETES MELLITUS WITH HYPERGLYCEMIA, UNSPECIFIED WHETHER LONG TERM INSULIN USE: ICD-10-CM

## 2025-01-13 RX ORDER — SEMAGLUTIDE 0.68 MG/ML
INJECTION, SOLUTION SUBCUTANEOUS
Qty: 4.5 ML | Refills: 1 | Status: SHIPPED | OUTPATIENT
Start: 2025-01-13

## 2025-01-13 RX ORDER — SEMAGLUTIDE 0.68 MG/ML
INJECTION, SOLUTION SUBCUTANEOUS
Qty: 4.5 ML | Refills: 1 | OUTPATIENT
Start: 2025-01-13

## 2025-01-13 NOTE — TELEPHONE ENCOUNTER
Caller: Sandrita Redman    Relationship: Self    Best call back number: [phone]     Requested Prescriptions:   Requested Prescriptions     Pending Prescriptions Disp Refills    Semaglutide,0.25 or 0.5MG/DOS, (Ozempic, 0.25 or 0.5 MG/DOSE,) 2 MG/3ML solution pen-injector 4.5 mL 1        Pharmacy where request should be sent:    [facility]  [facility] [place] [place] [facility] [address] - [phone]  - [phone] FX   [address] [facility] [address]   Phone: [phone] Fax: [phone]   Hours: Not open 24 hours     Last office visit with prescribing clinician: 12/4/2024   Last telemedicine visit with prescribing clinician: Visit date not found   Next office visit with prescribing clinician: 3/4/2025     Additional details provided by patient: PT HAS BEEN OUT OF MEDICATION FOR A DAY PLEASE ADVISE. PT IS NEEDING NEW REFILLS     Does the patient have less than a 3 day supply:  [x] Yes  [] No    Would you like a call back once the refill request has been completed: [x] Yes [] No    If the office needs to give you a call back, can they leave a voicemail: [x] Yes [] No    Ronnie Adhikari   01/13/25 11:31 EST

## 2025-03-13 ENCOUNTER — OFFICE VISIT (OUTPATIENT)
Dept: DIABETES SERVICES | Facility: HOSPITAL | Age: 65
End: 2025-03-13
Payer: COMMERCIAL

## 2025-03-13 VITALS
HEIGHT: 65 IN | DIASTOLIC BLOOD PRESSURE: 90 MMHG | WEIGHT: 206.5 LBS | OXYGEN SATURATION: 98 % | SYSTOLIC BLOOD PRESSURE: 130 MMHG | HEART RATE: 79 BPM | BODY MASS INDEX: 34.41 KG/M2

## 2025-03-13 DIAGNOSIS — E11.65 TYPE 2 DIABETES MELLITUS WITH HYPERGLYCEMIA, UNSPECIFIED WHETHER LONG TERM INSULIN USE: Primary | ICD-10-CM

## 2025-03-13 LAB
EXPIRATION DATE: ABNORMAL
GLUCOSE BLDC GLUCOMTR-MCNC: 109 MG/DL (ref 70–99)
HBA1C MFR BLD: 6 % (ref 4.5–5.7)
Lab: ABNORMAL

## 2025-03-13 PROCEDURE — 99213 OFFICE O/P EST LOW 20 MIN: CPT | Performed by: NURSE PRACTITIONER

## 2025-03-13 PROCEDURE — G0463 HOSPITAL OUTPT CLINIC VISIT: HCPCS | Performed by: NURSE PRACTITIONER

## 2025-03-13 PROCEDURE — 82948 REAGENT STRIP/BLOOD GLUCOSE: CPT | Performed by: NURSE PRACTITIONER

## 2025-03-13 PROCEDURE — 83036 HEMOGLOBIN GLYCOSYLATED A1C: CPT | Performed by: NURSE PRACTITIONER

## 2025-03-13 RX ORDER — ATORVASTATIN CALCIUM 10 MG/1
10 TABLET, FILM COATED ORAL NIGHTLY
COMMUNITY
Start: 2024-12-22

## 2025-03-13 NOTE — PROGRESS NOTES
Chief Complaint  Diabetes (Follow up, med mgt, A1C eval)    Referred By: Jarrod Tom MD    Patient or patient representative verbalized consent for the use of Ambient Listening during the visit with  ADEOLA Nieto for chart documentation. 3/13/2025  11:36 EDT    Subjective          Sandrita Redman presents to [facility] for diabetes medication management    History of Present Illness    History of Present Illness  The patient is a 64-year-old female who presents for a follow-up on her diabetes.    She reports no complications with her current Ozempic treatment, including the absence of polydipsia or polyuria. Her appetite remains suppressed under the current dosage. She is not experiencing any visual disturbances or fatigue. She has not encountered any episodes of hypoglycemia, with her highest recorded blood glucose level being 123. She monitors her blood glucose levels daily. Her last A1c was 6. She recently had an ophthalmological examination at [facility], which yielded normal results, and she was advised to return in a year. She has not had any recent laboratory tests conducted by Dr. Norton. She inquires if Lipitor can cause stiffness.    She mentions a fungal infection in her toenails, for which she has not sought any treatment. She expresses concern about the appearance of her toenails, particularly during the summer months.    MEDICATIONS  Current: Ozempic, Lipitor         Visit type:  follow-up  Diabetes type:  Type 2  Current diabetes status/concerns/issues: She denies any concerns with her diabetes at this time.  States her blood sugars are normally staying less than 123 mg/dL.  Other health concerns: No new health concerns  Current Diabetes symptoms:    Polyuria: No   Polydipsia: No   Polyphagia: No   Blurred vision: No   Excessive fatigue: No  Known Diabetes complications:  Neuropathy: None; Location: N/A  Renal:  Called PCP for labs to review  Eyes:  normal eye exam Last Eye Exam:  25 ; Location: [facility]Sarah Wakefield  Amputation/Wounds: None  GI: None  Cardiovascular: Hypertension  ED: N/A  Other: None  Hypoglycemia:  None reported at this time  Hypoglycemia Symptoms:  No hypoglycemia at this time  Current diabetes treatment:  Ozempic 0.5mg once wk   Blood glucose device:  Meter  Blood glucose monitoring frequency:  1  Blood glucose range/average:   less than 123  Glucose Source: Patient Reported  Dietary behavior:  Limits high carb/sweet foods, Avoids sugary drinks, Number of meals each day - 2; Number of snacks each day - 0  Activity/Exercise:  None       Past Medical History:   Diagnosis Date    Diabetes mellitus     Hypertension      Past Surgical History:   Procedure Laterality Date     SECTION      KIDNEY STONE SURGERY Right      Family History   Problem Relation Age of Onset    Heart disease Mother     Heart disease Father     Diabetes type II Father     Stroke Paternal Grandfather     Diabetes type II Paternal Grandfather      Social History     Socioeconomic History    Marital status:    Tobacco Use    Smoking status: Never   Vaping Use    Vaping status: Never Used   Substance and Sexual Activity    Alcohol use: Never    Drug use: Never     No Known Allergies    Current Outpatient Medications:     atorvastatin (LIPITOR) 10 MG tablet, Take 1 tablet by mouth Every Night., Disp: , Rfl:     Blood Glucose Monitoring Suppl (FreeStyle Lite) w/Device kit, 1 each by Other route See Admin Instructions., Disp: , Rfl:     dilTIAZem XR (DILACOR XR) 180 MG 24 hr capsule, Take 1 capsule by mouth Daily., Disp: , Rfl:     glucose blood test strip, Test blood glucose 1 times each day, Disp: 100 each, Rfl: 5    glucose monitor monitoring kit, Use 1 each Daily., Disp: 1 each, Rfl: 0    Lancets misc, Test blood glucose 1 times each day, Disp: 100 each, Rfl: 5    losartan-hydrochlorothiazide (HYZAAR) 100-12.5 MG per tablet, Take 1 tablet  "by mouth Daily., Disp: , Rfl:     meloxicam (MOBIC) 15 MG tablet, Take 1 tablet by mouth Daily., Disp: , Rfl:     nebivolol (Bystolic) 10 MG tablet, Take 1 tablet by mouth Daily., Disp: , Rfl:     Ozempic, 0.25 or 0.5 MG/DOSE, 2 MG/3ML solution pen-injector, inject 0.5 mg SUBCUTANEOUSLY into the appropriate area AS DIRECTED ONCE WEEKLY, Disp: 4.5 mL, Rfl: 1    Objective     Vitals:    03/13/25 1100 03/13/25 1112   BP: 136/83 130/90   BP Location: Right arm Left arm   Patient Position: Sitting Sitting   Cuff Size: Adult Adult   Pulse: 79    SpO2: 98%    Weight: 93.7 kg (206 lb 8 oz)    Height: 165.1 cm (65\")      Body mass index is 34.36 kg/m².    Physical Exam  Constitutional:       Appearance: Normal appearance. She is obese.      Comments: Obesity (BMI 30 - 39.9) Pt Current BMI = 34.36     HENT:      Head: Normocephalic and atraumatic.      Right Ear: External ear normal.      Left Ear: External ear normal.      Nose: Nose normal.   Eyes:      Extraocular Movements: Extraocular movements intact.      Conjunctiva/sclera: Conjunctivae normal.   Pulmonary:      Effort: Pulmonary effort is normal.   Musculoskeletal:         General: Normal range of motion.      Cervical back: Normal range of motion.   Skin:     General: Skin is warm and dry.   Neurological:      General: No focal deficit present.      Mental Status: She is alert and oriented to person, place, and time. Mental status is at baseline.   Psychiatric:         Mood and Affect: Mood normal.         Behavior: Behavior normal.         Thought Content: Thought content normal.         Judgment: Judgment normal.         Diabetic Foot Exam Performed and Monofilament Test Performed  Diabetic foot exam:   Left: Filament test present   Pulses Dorsalis Pedis:  present  Posterior Tibial:  present   Vibratory sensation normal   Proprioception normal   Sharp/dull discrimination normal       Right: Filament test present   Pulses Dorsalis Pedis:  present  Posterior Tibial: "  present   Vibratory sensation normal   Proprioception normal   Sharp/dull discrimination normal     Result Review :   The following data was reviewed by: ADEOLA Nieto on 03/13/2025:    Most Recent A1C          3/13/2025    11:16   HGBA1C Most Recent   Hemoglobin A1C 6.0        A1C Last 3 Results          9/27/2024    09:46 12/4/2024    10:48 3/13/2025    11:16   HGBA1C Last 3 Results   Hemoglobin A1C 6.2  6.0  6.0      A1c collected in the office today is 6%, indicating Controlled Type II diabetes.  This result is unchanged from the prior result of 6% collected on 12/4/24.     Glucose   Date Value Ref Range Status   03/13/2025 109 (H) 70 - 99 mg/dL Final     Comment:     Serial Number: [id number]:  [id number]     Point of care glucose in the office today is within normal limits for nonfasting glucose      Microalbumin, Urine   Date Value Ref Range Status   09/27/2024 <1.2 mg/dL Final     Creatinine, Urine   Date Value Ref Range Status   09/27/2024 12.1 mg/dL Final     Microalbumin/Creatinine Ratio   Date Value Ref Range Status   09/27/2024   Final     Comment:     Unable to calculate     Urine microalbuminuria collected on 9/27/24 is negative for microalbuminuria                Diagnoses and all orders for this visit:    1. Type 2 diabetes mellitus with hyperglycemia, unspecified whether long term insulin use (Primary)  -     POC Glycosylated Hemoglobin (Hb A1C)    Other orders  -     POC Glucose          Assessment & Plan  1. Diabetes Mellitus.  Her blood glucose levels are well-regulated, with a target range of 70 to 130. The most recent A1c level is 6, consistent with the previous reading. The last urine microalbumin test was conducted in September 2024, and another is due in September 2025 to ensure no proteinuria. The previous result was within normal limits. Reviewed labs from December with patient. Her lipid profile has significantly improved with statin therapy, and current levels are  within the normal range.   The comprehensive metabolic panel (CMP) indicates normal renal and hepatic function. She will continue her Lipitor regimen. She has been advised to increase her water intake to alleviate potential myalgias associated with Lipitor use. A foot examination was performed today which was within normal limits. She will continue her Ozempic regimen, with a refill already provided in January for 6 months.    2. Onychomycosis.  She has been advised to soak her feet in a solution of white vinegar and water, mixed in equal parts. If this approach proves ineffective, she may consider using topical Lamisil. If topical treatments are insufficient, oral Lamisil can be considered, but liver function will need to be monitored.    Follow-up  The patient is scheduled for a follow-up visit in 3 months.      The patient will monitor her blood glucose levels 1 time daily.  If she develops problematic hyperglycemia or hypoglycemia or adverse drug reactions, she will contact the office for further instructions.        Follow Up     Return in about 3 months (around 6/13/2025) for Medication Mgmt.    Patient was given instructions and counseling regarding her condition or for health maintenance advice. Please see specific information pulled into the AVS if appropriate.     Kimberley Ramirez, APRN  03/13/2025      Dictated Utilizing Dragon Dictation.  Please note that portions of this note were completed with a voice recognition program.  Part of this note may be an electronic transcription/translation of spoken language to printed text using the Dragon Dictation System.

## 2025-04-10 DIAGNOSIS — E11.65 TYPE 2 DIABETES MELLITUS WITH HYPERGLYCEMIA, UNSPECIFIED WHETHER LONG TERM INSULIN USE: ICD-10-CM

## 2025-04-10 RX ORDER — SEMAGLUTIDE 0.68 MG/ML
INJECTION, SOLUTION SUBCUTANEOUS
Qty: 4.5 ML | Refills: 1 | Status: SHIPPED | OUTPATIENT
Start: 2025-04-10

## 2025-04-10 NOTE — TELEPHONE ENCOUNTER
Last OV 03/13/2025    Next scheduled appt 06/12/2025    Patient requesting refill of   Ozempic 2mg/3ml inject 0.5mg weekly.

## 2025-04-25 RX ORDER — ATORVASTATIN CALCIUM 10 MG/1
10 TABLET, FILM COATED ORAL DAILY
Qty: 90 TABLET | Refills: 3 | OUTPATIENT
Start: 2025-04-25

## 2025-04-25 NOTE — TELEPHONE ENCOUNTER
Last OV 03/13/025    Next scheduled appt 06/12/2025    Patient requesting rfill of Lipitor.  Provider has never filled this prescript.  Patient needs to ask PCP.    Any other problems or concerns patient to call office back

## 2025-04-28 ENCOUNTER — PRIOR AUTHORIZATION (OUTPATIENT)
Dept: DIABETES SERVICES | Facility: HOSPITAL | Age: 65
End: 2025-04-28
Payer: COMMERCIAL

## 2025-04-28 NOTE — TELEPHONE ENCOUNTER
Key: [id number]    PA request received from [facility] for the following medication    Ozempic, 0.25 or 0.5 MG/DOSE, 2 MG/3ML solution pen-injector (04/10/2025)

## 2025-05-12 DIAGNOSIS — E78.49 OTHER HYPERLIPIDEMIA: ICD-10-CM

## 2025-05-12 RX ORDER — ATORVASTATIN CALCIUM 10 MG/1
10 TABLET, FILM COATED ORAL DAILY
Qty: 90 TABLET | Refills: 1 | Status: SHIPPED | OUTPATIENT
Start: 2025-05-12

## 2025-06-11 NOTE — PROGRESS NOTES
Chief Complaint  Diabetes (Follow up, med mgt, A1c eval, )    Referred By: Jarrod Tom MD    Patient or patient representative verbalized consent for the use of Ambient Listening during the visit with  ADEOLA Nieto for chart documentation. 6/12/2025  11:36 EDT    Subjective          Sandrita Redman presents to [facility] for diabetes medication management    History of Present Illness    History of Present Illness  The patient presents today to follow up on her diabetes.    She has been under significant stress due to her 's recent stroke, which resulted in a 32-day hospital stay and ongoing physical therapy at [facility]. This stress has led to an unintentional weight loss of 8 pounds since her last visit. She reports no new health concerns, including frequent thirst or urination, blurred vision, or fatigue. However, she reports a decreased appetite, which she attributes to the stress of her 's condition and frequent hospital visits. She continues her regimen of Ozempic 0.5 mg once weekly, which she reports as effective. However, she has not monitored her blood glucose levels for the past month.         Visit type:  follow-up  Diabetes type:  Type 2  Current diabetes status/concerns/issues: Reports she has not been monitoring her blood sugars since her has been has been in the hospital and rehab for his stroke.  Other health concerns: No new health concerns  Current Diabetes symptoms:    Polyuria: No   Polydipsia: No   Polyphagia: No   Blurred vision: No   Excessive fatigue: No  Known Diabetes complications:  Neuropathy: None; Location: N/A  Renal:  Called PCP for labs to review  Eyes: normal eye exam Last Eye Exam:  1/16/25 ; Location: [facility]  Amputation/Wounds: None  GI: None  Cardiovascular: Hypertension  ED: N/A  Other: None  Hypoglycemia:  None reported at this time  Hypoglycemia Symptoms:  No hypoglycemia at this  time  Current diabetes treatment:  Ozempic 0.5mg once wk   Blood glucose device:  Not currently monitoring BG  Blood glucose monitoring frequency:  Not currently monitoring BG  Blood glucose range/average:  Not currently monitoring BG  Glucose Source: N/A  Dietary behavior:  Limits high carb/sweet foods, Avoids sugary drinks, Number of meals each day - 2; Number of snacks each day - 0  Activity/Exercise:  None       Past Medical History:   Diagnosis Date    Diabetes mellitus     Hypertension      Past Surgical History:   Procedure Laterality Date     SECTION      KIDNEY STONE SURGERY Right      Family History   Problem Relation Age of Onset    Heart disease Mother     Heart disease Father     Diabetes type II Father     Stroke Paternal Grandfather     Diabetes type II Paternal Grandfather      Social History     Socioeconomic History    Marital status:    Tobacco Use    Smoking status: Never   Vaping Use    Vaping status: Never Used   Substance and Sexual Activity    Alcohol use: Never    Drug use: Never     No Known Allergies    Current Outpatient Medications:     atorvastatin (LIPITOR) 10 MG tablet, TAKE 1 TABLET BY MOUTH DAILY, Disp: 90 tablet, Rfl: 1    Blood Glucose Monitoring Suppl (FreeStyle Lite) w/Device kit, 1 each by Other route See Admin Instructions., Disp: , Rfl:     dilTIAZem XR (DILACOR XR) 180 MG 24 hr capsule, Take 1 capsule by mouth Daily., Disp: , Rfl:     glucose blood test strip, Test blood glucose 1 times each day, Disp: 100 each, Rfl: 5    glucose monitor monitoring kit, Use 1 each Daily., Disp: 1 each, Rfl: 0    Lancets misc, Test blood glucose 1 times each day, Disp: 100 each, Rfl: 5    losartan-hydrochlorothiazide (HYZAAR) 100-12.5 MG per tablet, Take 1 tablet by mouth Daily., Disp: , Rfl:     meloxicam (MOBIC) 15 MG tablet, Take 1 tablet by mouth Daily., Disp: , Rfl:     nebivolol (Bystolic) 10 MG tablet, Take 1 tablet by mouth Daily., Disp: , Rfl:     Ozempic, 0.25 or 0.5  "MG/DOSE, 2 MG/3ML solution pen-injector, inject 0.5 mg SUBCUTANEOUSLY into the appropriate area AS DIRECTED ONCE WEEKLY, Disp: 4.5 mL, Rfl: 1    Objective     Vitals:    06/12/25 1114   BP: 132/80   Pulse: 74   SpO2: 98%   Weight: 89.8 kg (198 lb)   Height: 165.1 cm (65\")   PainSc: 0-No pain     Body mass index is 32.95 kg/m².    Physical Exam  Constitutional:       Appearance: Normal appearance. She is obese.      Comments: Obesity (BMI 30 - 39.9) Pt Current BMI = 32.95     HENT:      Head: Normocephalic and atraumatic.      Right Ear: External ear normal.      Left Ear: External ear normal.      Nose: Nose normal.   Eyes:      Extraocular Movements: Extraocular movements intact.      Conjunctiva/sclera: Conjunctivae normal.   Pulmonary:      Effort: Pulmonary effort is normal.   Musculoskeletal:         General: Normal range of motion.      Cervical back: Normal range of motion.   Skin:     General: Skin is warm and dry.   Neurological:      General: No focal deficit present.      Mental Status: She is alert and oriented to person, place, and time. Mental status is at baseline.   Psychiatric:         Mood and Affect: Mood normal.         Behavior: Behavior normal.         Thought Content: Thought content normal.         Judgment: Judgment normal.             Result Review :   The following data was reviewed by: ADEOLA Neito on 06/12/2025:    Most Recent A1C          6/12/2025    11:28   HGBA1C Most Recent   Hemoglobin A1C 6.8        A1C Last 3 Results          12/4/2024    10:48 3/13/2025    11:16 6/12/2025    11:28   HGBA1C Last 3 Results   Hemoglobin A1C 6.0  6.0  6.8      A1c collected in the office today is 6.8%, indicating Controlled Type II diabetes.  This result is up from the prior result of 6% collected on 3/13/25.     Glucose   Date Value Ref Range Status   06/12/2025 105 (H) 70 - 99 mg/dL Final     Comment:     Serial Number: [id number]:  [id number]     Point of care glucose in the " office today is within normal limits for nonfasting glucose      Microalbumin, Urine   Date Value Ref Range Status   09/27/2024 <1.2 mg/dL Final     Creatinine, Urine   Date Value Ref Range Status   09/27/2024 12.1 mg/dL Final     Microalbumin/Creatinine Ratio   Date Value Ref Range Status   09/27/2024   Final     Comment:     Unable to calculate     Urine microalbuminuria collected on 9/27/24 is negative for microalbuminuria                Diagnoses and all orders for this visit:    1. Type 2 diabetes mellitus with hyperglycemia, unspecified whether long term insulin use (Primary)  -     POC Glycosylated Hemoglobin (Hb A1C)    2. Obesity (BMI 30-39.9)    3. Type 2 diabetes mellitus with hemoglobin A1c goal of less than 7.0%    Other orders  -     POC Glucose          Assessment & Plan  1. Diabetes Mellitus.  - A1c level has increased to 6.8, likely due to stress and dietary changes from frequent hospital visits.  - Increased pain and limited mobility.  - She has not been checking her blood sugars over the past month due to her 's health issues.  - Continue with the current dosage of Ozempic 0.5 mg once a week. Prescription refill for Ozempic provided for 3 months. If A1c remains elevated at the next visit, an increase in the Ozempic dosage will be considered.      The patient will monitor her blood glucose levels 1 time daily fasting.  If she develops problematic hyperglycemia or hypoglycemia or adverse drug reactions, she will contact the office for further instructions.        Follow Up     Return in about 3 months (around 9/12/2025) for Medication Mgmt.    Patient was given instructions and counseling regarding her condition or for health maintenance advice. Please see specific information pulled into the AVS if appropriate.     Kimberley Ramirez, ADEOLA  06/12/2025      Dictated Utilizing Dragon Dictation.  Please note that portions of this note were completed with a voice recognition program.  Part of  this note may be an electronic transcription/translation of spoken language to printed text using the Dragon Dictation System.

## 2025-06-12 ENCOUNTER — OFFICE VISIT (OUTPATIENT)
Dept: DIABETES SERVICES | Facility: HOSPITAL | Age: 65
End: 2025-06-12
Payer: COMMERCIAL

## 2025-06-12 VITALS
HEIGHT: 65 IN | SYSTOLIC BLOOD PRESSURE: 132 MMHG | WEIGHT: 198 LBS | DIASTOLIC BLOOD PRESSURE: 80 MMHG | HEART RATE: 74 BPM | BODY MASS INDEX: 32.99 KG/M2 | OXYGEN SATURATION: 98 %

## 2025-06-12 DIAGNOSIS — E11.65 TYPE 2 DIABETES MELLITUS WITH HYPERGLYCEMIA, UNSPECIFIED WHETHER LONG TERM INSULIN USE: Primary | ICD-10-CM

## 2025-06-12 DIAGNOSIS — E11.9 TYPE 2 DIABETES MELLITUS WITH HEMOGLOBIN A1C GOAL OF LESS THAN 7.0%: ICD-10-CM

## 2025-06-12 DIAGNOSIS — E66.9 OBESITY (BMI 30-39.9): ICD-10-CM

## 2025-06-12 LAB
EXPIRATION DATE: ABNORMAL
GLUCOSE BLDC GLUCOMTR-MCNC: 105 MG/DL (ref 70–99)
HBA1C MFR BLD: 6.8 % (ref 4.5–5.7)
Lab: ABNORMAL

## 2025-06-12 PROCEDURE — 99214 OFFICE O/P EST MOD 30 MIN: CPT | Performed by: NURSE PRACTITIONER

## 2025-06-12 PROCEDURE — G0463 HOSPITAL OUTPT CLINIC VISIT: HCPCS | Performed by: NURSE PRACTITIONER

## 2025-06-12 PROCEDURE — 82948 REAGENT STRIP/BLOOD GLUCOSE: CPT | Performed by: NURSE PRACTITIONER

## 2025-06-12 PROCEDURE — 83036 HEMOGLOBIN GLYCOSYLATED A1C: CPT | Performed by: NURSE PRACTITIONER

## 2025-07-11 DIAGNOSIS — E11.65 TYPE 2 DIABETES MELLITUS WITH HYPERGLYCEMIA, UNSPECIFIED WHETHER LONG TERM INSULIN USE: ICD-10-CM

## 2025-07-14 RX ORDER — SEMAGLUTIDE 0.68 MG/ML
INJECTION, SOLUTION SUBCUTANEOUS
Qty: 4.5 ML | Refills: 1 | Status: SHIPPED | OUTPATIENT
Start: 2025-07-14